# Patient Record
Sex: MALE | Race: BLACK OR AFRICAN AMERICAN
[De-identification: names, ages, dates, MRNs, and addresses within clinical notes are randomized per-mention and may not be internally consistent; named-entity substitution may affect disease eponyms.]

---

## 2017-11-14 ENCOUNTER — HOSPITAL ENCOUNTER (EMERGENCY)
Dept: HOSPITAL 74 - JER | Age: 47
Discharge: HOME | End: 2017-11-14
Payer: SELF-PAY

## 2017-11-14 VITALS — HEART RATE: 75 BPM | SYSTOLIC BLOOD PRESSURE: 153 MMHG | DIASTOLIC BLOOD PRESSURE: 81 MMHG

## 2017-11-14 VITALS — BODY MASS INDEX: 39.1 KG/M2

## 2017-11-14 VITALS — TEMPERATURE: 97.9 F

## 2017-11-14 DIAGNOSIS — F17.290: ICD-10-CM

## 2017-11-14 DIAGNOSIS — R10.31: Primary | ICD-10-CM

## 2017-11-14 LAB
ANION GAP SERPL CALC-SCNC: 7 MMOL/L (ref 8–16)
APPEARANCE UR: CLEAR
BASOPHILS # BLD: 0.8 % (ref 0–2)
BILIRUB UR STRIP.AUTO-MCNC: NEGATIVE MG/DL
CALCIUM SERPL-MCNC: 8.5 MG/DL (ref 8.5–10.1)
CO2 SERPL-SCNC: 27 MMOL/L (ref 21–32)
COLOR UR: (no result)
CREAT SERPL-MCNC: 0.8 MG/DL (ref 0.7–1.3)
DEPRECATED RDW RBC AUTO: 14 % (ref 11.9–15.9)
EOSINOPHIL # BLD: 1.1 % (ref 0–4.5)
GLUCOSE SERPL-MCNC: 100 MG/DL (ref 74–106)
KETONES UR QL STRIP: NEGATIVE
MCH RBC QN AUTO: 28.5 PG (ref 25.7–33.7)
MCHC RBC AUTO-ENTMCNC: 33.2 G/DL (ref 32–35.9)
MCV RBC: 85.7 FL (ref 80–96)
NEUTROPHILS # BLD: 62.4 % (ref 42.8–82.8)
NITRITE UR QL STRIP: NEGATIVE
PH UR: 6 [PH] (ref 5–8)
PLATELET # BLD AUTO: 165 K/MM3 (ref 134–434)
PMV BLD: 9.9 FL (ref 7.5–11.1)
PROT UR QL STRIP: NEGATIVE
PROT UR QL STRIP: NEGATIVE
RBC # UR STRIP: NEGATIVE /UL
SP GR UR: 1.01 (ref 1–1.03)
UROBILINOGEN UR STRIP-MCNC: NEGATIVE MG/DL (ref 0.2–1)
WBC # BLD AUTO: 6.6 K/MM3 (ref 4–10)

## 2017-11-14 PROCEDURE — 3E0333Z INTRODUCTION OF ANTI-INFLAMMATORY INTO PERIPHERAL VEIN, PERCUTANEOUS APPROACH: ICD-10-PCS | Performed by: EMERGENCY MEDICINE

## 2017-11-14 PROCEDURE — 3E033NZ INTRODUCTION OF ANALGESICS, HYPNOTICS, SEDATIVES INTO PERIPHERAL VEIN, PERCUTANEOUS APPROACH: ICD-10-PCS | Performed by: EMERGENCY MEDICINE

## 2017-11-14 NOTE — PDOC
History of Present Illness





- General


History Source: Patient


Exam Limitations: No Limitations





- History of Present Illness


Initial Comments: 





11/14/17 15:32


The patient is a 47 year old male, with no significant past medical history who 

presents to the emergency department with R flank pain.  Patient reports sudden 

onset of R flank pain that began yesterday while at rest. Patient reports 

associated foul smelling and dark colored urine. Patient denies taking any 

medications for relief and presents to the ED for further evaluation.  





He denies chest pain, headache or dizziness. He denies vomit, diarrhea or 

constipation. He denies dysuria, frequency, urgency or hematuria. Patient 

denies recent travel. 





Allergies: shellfish 


Past surgical history: None


Social history: Marijuana use


PCP:  None














<Ivis Siddiqui - Last Filed: 11/14/17 15:31>





<David Schaffer - Last Filed: 11/14/17 19:15>





- General


Chief Complaint: Urinary Problem


Stated Complaint: BLADDER INFECTION


Time Seen by Provider: 11/14/17 13:30





Past History





<Ivis Siddiqui - Last Filed: 11/14/17 15:31>





- Past Medical History


Asthma: Yes (BRONCHITIS)


CVA: No


COPD: No





- Immunization History


Td Vaccination: No (UNKNOWN)


Immunization Up to Date: Yes





- Suicide/Smoking/Psychosocial Hx


Smoking Status: Yes (CIGARS)


Smoking History: Never smoked


Have you smoked in the past 12 months: No


Number of Cigarettes Smoked Daily: 0


Cigars Per Day: 3


Information on smoking cessation initiated: No


Hx Alcohol Use: No


Drug/Substance Use Hx: Yes (Marijuna)


Substance Use Type: Marijuana





<David Schfafer - Last Filed: 11/14/17 19:15>





- Past Medical History


Allergies/Adverse Reactions: 


 Allergies











Allergy/AdvReac Type Severity Reaction Status Date / Time


 


shellfish derived AdvReac Severe Difficulty Verified 11/14/17 12:52





   Breathing  











Home Medications: 


Ambulatory Orders





No Home Medications 0 dose .ROUTE UTDICT 11/11/12 











**Review of Systems





- Review of Systems


Able to Perform ROS?: Yes


Comments:: 





11/14/17 15:32





A complete review of 10 out of 10 review of systems is taken and is negative 

apart from what is previously mentioned below and in the HPI.














<Ivis Siddiqui - Last Filed: 11/14/17 15:31>





*Physical Exam





- Vital Signs


 Last Vital Signs











Temp Pulse Resp BP Pulse Ox


 


 97.9 F   63   16   154/99   98 


 


 11/14/17 12:53  11/14/17 12:53  11/14/17 12:53  11/14/17 12:53  11/14/17 12:53














- Physical Exam


Comments: 





11/14/17 15:32


Vitals: Triage Vital signs reviewed


General Appearance:  no acute distress, well nourished well developed,


Head: Atraumatic, normocephalic


Neck:  Supple;No Nuchal rigidity


Chest Wall: Nontender


Cardiac: Regular rate and rhythm, no murmurs, no rubs, no gallops,


Lungs: Clear to auscultation bilateral, good air movement bilaterally,


Abdomen: R CVA Tenderness. Soft, nondistended, normal bowel sounds, nontender 

to palpation


Extremities: Full range of motion to all extremities, no cyanosis, clubbing, or 

edema


Skin:  Warm and dry, no rashes or lesions, no petechiae








<Ivis Siddiqui - Last Filed: 11/14/17 15:31>





- Vital Signs


 Last Vital Signs











Temp Pulse Resp BP Pulse Ox


 


 97.9 F   63   16   154/99   98 


 


 11/14/17 12:53  11/14/17 12:53  11/14/17 12:53  11/14/17 12:53  11/14/17 12:53














<David Schaffer - Last Filed: 11/14/17 19:15>





ED Treatment Course





- LABORATORY


CBC & Chemistry Diagram: 


 11/14/17 14:10





 11/14/17 14:32





- ADDITIONAL ORDERS


Additional order review: 


 Laboratory  Results











  11/14/17





  14:51


 


Urine Color  Ltyellow


 


Urine Appearance  Clear


 


Urine pH  6.0


 


Ur Specific Gravity  1.013


 


Urine Protein  Negative


 


Urine Glucose (UA)  Negative


 


Urine Ketones  Negative


 


Urine Blood  Negative


 


Urine Nitrite  Negative


 


Urine Bilirubin  Negative


 


Urine Urobilinogen  Negative














- Medications


Given in the ED: 


ED Medications














Discontinued Medications














Generic Name Dose Route Start Last Admin





  Trade Name Yomaira  PRN Reason Stop Dose Admin


 


Morphine Sulfate  4 mg  11/14/17 13:51  11/14/17 14:00





  Morphine Injection -  IVPUSH  11/14/17 13:52  4 mg





  ONCE ONE   Administration


 


Sodium Chloride  1,000 ml  11/14/17 13:51  11/14/17 14:15





  Normal Saline -  IV  11/14/17 13:52  1,000 ml





  ONCE ONE   Administration














<Ivis Siddiqui - Last Filed: 11/14/17 15:31>





- LABORATORY


CBC & Chemistry Diagram: 


 11/14/17 14:10





 11/14/17 14:32





<David Schaffer - Last Filed: 11/14/17 19:15>





Medical Decision Making





- Medical Decision Making





11/14/17 16:40


Reevaluation: 4:30 PM pain is much improved patient tolerating fluids by mouth. 

No fever no white count normal urinalysis repeat abdominal exam much improved 

from earlier very mild right sided tenderness but there is no rebound no 

guarding. The patient's CAT scan demonstrates no acute pathology there is no 

evidence of appendicitis on CAT scan





Given no fever no white count no findings of appendicitis on CT we discussed 

patient staying in the hospital for observation given that there still is a 

slight amount of pain or returning home and see how the pain progresses over 

the next few hours. Patient is here with his wife states he would prefer to go 

home he will return to the emergency department within the next several hours 

if the pain does not completely resolve or immediately if it worsens





Given no acute findings on laboratory analysis and CAT scan I feel that this is 

a reasonable plan





Findings, the need for follow-up and strict return instructions discussed with 

patient.





<David Schaffer - Last Filed: 11/14/17 19:15>





*DC/Admit/Observation/Transfer





- Attestations


Scribe Attestion: 





11/14/17 15:32





Documentation prepared by Ivis Siddiqui, acting as medical scribe for David Schaffer MD





<Ivis Siddiqui - Last Filed: 11/14/17 15:31>





- Discharge Dispostion


Admit: No





<David Schaffer - Last Filed: 11/14/17 19:15>


Diagnosis at time of Disposition: 


Abdominal pain


Qualifiers:


 Abdominal location: right lower quadrant Qualified Code(s): R10.31 - Right 

lower quadrant pain








- Discharge Dispostion


Disposition: HOME





- Referrals


Referrals: 


Radhames Valentino MD [Staff Physician] - 





- Patient Instructions


Printed Discharge Instructions:  DI for Abdominal Pain-Adult


Additional Instructions: 


The emergency department there was no definitive diagnosis made for your 

abdominal pain. Your blood work and CAT scan did not demonstrate any acute 

findings. Given that your pain has improved together we have determined it is 

safe to return home. Please return to the emergency department immediately for 

any pain is that does not completely improve or that returns he comes more 

intense. Tonight drink plenty of fluids and return to ED for any concerns.





- Post Discharge Activity


Forms/Work/School Notes:  Back to Work, Parent(s) Back to Work Note

## 2018-08-27 ENCOUNTER — HOSPITAL ENCOUNTER (EMERGENCY)
Dept: HOSPITAL 74 - JER | Age: 48
Discharge: HOME | End: 2018-08-27
Payer: COMMERCIAL

## 2018-08-27 VITALS — TEMPERATURE: 98.3 F | SYSTOLIC BLOOD PRESSURE: 136 MMHG | DIASTOLIC BLOOD PRESSURE: 95 MMHG | HEART RATE: 78 BPM

## 2018-08-27 VITALS — BODY MASS INDEX: 50.4 KG/M2

## 2018-08-27 DIAGNOSIS — R35.8: Primary | ICD-10-CM

## 2018-08-27 DIAGNOSIS — Z91.013: ICD-10-CM

## 2018-08-27 DIAGNOSIS — F17.290: ICD-10-CM

## 2018-08-27 DIAGNOSIS — E66.01: ICD-10-CM

## 2018-08-27 LAB
ALBUMIN SERPL-MCNC: 3.9 G/DL (ref 3.4–5)
ALP SERPL-CCNC: 52 U/L (ref 45–117)
ALT SERPL-CCNC: 20 U/L (ref 12–78)
ANION GAP SERPL CALC-SCNC: 4 MMOL/L (ref 8–16)
APPEARANCE UR: CLEAR
AST SERPL-CCNC: 19 U/L (ref 15–37)
BASOPHILS # BLD: 1.2 % (ref 0–2)
BILIRUB SERPL-MCNC: 0.4 MG/DL (ref 0.2–1)
BILIRUB UR STRIP.AUTO-MCNC: NEGATIVE MG/DL
BUN SERPL-MCNC: 8 MG/DL (ref 7–18)
CALCIUM SERPL-MCNC: 8.9 MG/DL (ref 8.5–10.1)
CHLORIDE SERPL-SCNC: 105 MMOL/L (ref 98–107)
CO2 SERPL-SCNC: 32 MMOL/L (ref 21–32)
COLOR UR: (no result)
CREAT SERPL-MCNC: 1 MG/DL (ref 0.7–1.3)
DEPRECATED RDW RBC AUTO: 14 % (ref 11.9–15.9)
EOSINOPHIL # BLD: 2.8 % (ref 0–4.5)
EPITH CASTS URNS QL MICRO: (no result) /HPF
GLUCOSE SERPL-MCNC: 94 MG/DL (ref 74–106)
HCT VFR BLD CALC: 44 % (ref 35.4–49)
HGB BLD-MCNC: 14.8 GM/DL (ref 11.7–16.9)
KETONES UR QL STRIP: NEGATIVE
LEUKOCYTE ESTERASE UR QL STRIP.AUTO: (no result)
LYMPHOCYTES # BLD: 28.3 % (ref 8–40)
MCH RBC QN AUTO: 28.9 PG (ref 25.7–33.7)
MCHC RBC AUTO-ENTMCNC: 33.7 G/DL (ref 32–35.9)
MCV RBC: 85.9 FL (ref 80–96)
MONOCYTES # BLD AUTO: 13 % (ref 3.8–10.2)
NEUTROPHILS # BLD: 54.7 % (ref 42.8–82.8)
NITRITE UR QL STRIP: NEGATIVE
PH UR: 6 [PH] (ref 5–8)
PLATELET # BLD AUTO: 173 K/MM3 (ref 134–434)
PMV BLD: 10.1 FL (ref 7.5–11.1)
POTASSIUM SERPLBLD-SCNC: 4.4 MMOL/L (ref 3.5–5.1)
PROT SERPL-MCNC: 7.1 G/DL (ref 6.4–8.2)
PROT UR QL STRIP: NEGATIVE
PROT UR QL STRIP: NEGATIVE
RBC # BLD AUTO: 5.13 M/MM3 (ref 4–5.6)
SODIUM SERPL-SCNC: 141 MMOL/L (ref 136–145)
SP GR UR: 1.01 (ref 1–1.03)
UROBILINOGEN UR STRIP-MCNC: NEGATIVE MG/DL (ref 0.2–1)
WBC # BLD AUTO: 5.9 K/MM3 (ref 4–10)

## 2018-08-27 NOTE — PDOC
History of Present Illness





- General


History Source: Patient





- History of Present Illness


Associated Symptoms: denies: fever/chills, nausea/vomiting, weakness





<Huang Villa - Last Filed: 08/27/18 14:57>





<Ginny Carpenter - Last Filed: 08/28/18 16:14>





- General


Chief Complaint: Pain


Stated Complaint: URINARY PROBLEM


Time Seen by Provider: 08/27/18 12:39





Past History





- Past Medical History


Asthma: Yes (BRONCHITIS)


CVA: No


COPD: No


DVT: No





- Immunization History


Td Vaccination: No (UNKNOWN)


Immunization Up to Date: Yes





- Suicide/Smoking/Psychosocial Hx


Smoking Status: Yes (CIGARS)


Smoking History: Never smoked


Have you smoked in the past 12 months: No


Number of Cigarettes Smoked Daily: 0


Cigars Per Day: 3


Information on smoking cessation initiated: No


Hx Alcohol Use: No


Drug/Substance Use Hx: Yes (Marijuna)


Substance Use Type: Marijuana





<Huang Villa - Last Filed: 08/27/18 14:57>





<Ginny Carpenter - Last Filed: 08/28/18 16:14>





- Past Medical History


Allergies/Adverse Reactions: 


 Allergies











Allergy/AdvReac Type Severity Reaction Status Date / Time


 


shellfish derived AdvReac Severe Difficulty Verified 08/27/18 12:10





   Breathing  











Home Medications: 


Ambulatory Orders





No Home Medications 0 dose .ROUTE UTDICT 11/11/12 











**Review of Systems





- Review of Systems


Constitutional: No: Chills, Fever, Malaise, Weakness, Unexplained wgt Loss


HEENTM: No: Blurred Vision


Respiratory: No: Shortness of Breath


Cardiac (ROS): No: Chest Pain


ABD/GI: No: Nausea, Vomiting, Abdominal cramping


: Yes: Frequency, Urgency.  No: Burning, Dysuria, Discharge, Flank Pain, 

Hematuria


Endocrine: Yes: Increased Thirst, Increased Urine





<Huang Villa - Last Filed: 08/27/18 14:57>





*Physical Exam





- Vital Signs


 Last Vital Signs











Temp Pulse Resp BP Pulse Ox


 


 98.3 F   78   18   136/95   100 


 


 08/27/18 12:11  08/27/18 12:11  08/27/18 12:11  08/27/18 12:11  08/27/18 12:11














- Physical Exam


General Appearance: Yes: Appropriately Dressed.  No: Apparent Distress


HEENT: positive: Normal Voice


Neck: positive: Supple


Respiratory/Chest: negative: Respiratory Distress


Gastrointestinal/Abdominal: positive: Soft.  negative: Tender


Integumentary: positive: Dry, Warm


Neurologic: positive: Fully Oriented, Alert, Normal Mood/Affect





<Huang Villa - Last Filed: 08/27/18 14:57>





- Vital Signs


 Last Vital Signs











Temp Pulse Resp BP Pulse Ox


 


 98.3 F   78   18   136/95   100 


 


 08/27/18 12:11  08/27/18 12:11  08/27/18 12:11  08/27/18 12:11  08/27/18 12:11














<Ginny Carpenter - Last Filed: 08/28/18 16:14>





ED Treatment Course





- LABORATORY


CBC & Chemistry Diagram: 


 08/27/18 13:00





 08/27/18 13:00





<Huang Villa - Last Filed: 08/27/18 14:57>





- LABORATORY


CBC & Chemistry Diagram: 


 08/27/18 13:00





 08/27/18 13:00





- ADDITIONAL ORDERS


Additional order review: 














 08/27/18 14:00 Urine Culture - Final





 Urine - Urine Clean Catch 








 











  08/27/18





  13:00


 


RBC  5.13


 


MCV  85.9


 


MCHC  33.7


 


RDW  14.0


 


MPV  10.1


 


Neutrophils %  54.7


 


Lymphocytes %  28.3  D


 


Monocytes %  13.0 H


 


Eosinophils %  2.8  D


 


Basophils %  1.2














<Ginny Carpenter - Last Filed: 08/28/18 16:14>





Medical Decision Making





- Medical Decision Making





08/27/18 13:02


47-year-old male, morbidly obesed (gained 40 pounds over the past yr per records

), here with polyruia 1 month.  Patient states for the past month, he gets up 

frequently to urinate throughout the night. Patient does report that he drinks 

plenty of fluids throughout the day, sometimes as late as midnight, which is 

not new for patient.  Unclear if patient has increased thirst.  Decided to come 

in for evaluation for first time today because of ongoing symptoms.   Denies 

dysuria, hematuria, penile discharge, testes pain/swelling, flank pain, n/v/f/c

, hesitancy, dribbling, dizziness, weakness, dry mouth or visual changes.  

States he does not have a PMD and has not gotten a comprehensive exam in many 

years. Denies fmhx of DM





See exam





R/o new onset DM, less likely UTI


Concern for new onset DM in morbidly obesed male w/ polydipsia/uria, less 

likely uti


Stable and in NAD


-labs/ua











08/27/18 14:30





08/27/18 14:31


Labs unremarkable including glucose and hemoglobin A1c.  UA with trace leuk 

esterase and 4 wbc's.  Will hold off on abx at this time pending ucx. Stable 

for dc w/ instructions to decrease fluid intake in the day, especially near 

bedtime.  Patient given referral for primary care











<Huang Villa - Last Filed: 08/27/18 14:57>





- Medical Decision Making





The patient was seen and evaluated in conjunction with midlevel provider under 

my direct supervision, ancillary studies were reviewed.  I agree with the plan 

as outlined by LOGAN Villa








08/28/18 16:14








<Ginny Carpenter - Last Filed: 08/28/18 16:14>





*DC/Admit/Observation/Transfer





<Huang Villa - Last Filed: 08/27/18 14:57>





<Ginny Carpenter - Last Filed: 08/28/18 16:14>


Diagnosis at time of Disposition: 


 Urinary frequency








- Discharge Dispostion


Disposition: HOME


Condition at time of disposition: Good





- Referrals


Referrals: 


[facility] [place] [Provider Group]





- Patient Instructions


Additional Instructions: 


The cause of  your symptoms are unclear at this time. 


Based on your lab work there is no evidence of diabetes or a urine infection.  

The cause of your symptoms could possibly be due to significant fluid intake 

during the day and near bedtime.  Please limit fluid intake  throughout the day 

and see if symptoms improve.


You need to follow-up with a primary care physician for comprehensive exam and 

was given referral to do so

## 2019-01-04 ENCOUNTER — HOSPITAL ENCOUNTER (EMERGENCY)
Dept: HOSPITAL 74 - JER | Age: 49
Discharge: HOME | End: 2019-01-04
Payer: COMMERCIAL

## 2019-01-04 VITALS — SYSTOLIC BLOOD PRESSURE: 142 MMHG | HEART RATE: 86 BPM | DIASTOLIC BLOOD PRESSURE: 88 MMHG

## 2019-01-04 VITALS — TEMPERATURE: 98.4 F

## 2019-01-04 VITALS — BODY MASS INDEX: 79.2 KG/M2

## 2019-01-04 DIAGNOSIS — J06.9: ICD-10-CM

## 2019-01-04 DIAGNOSIS — E66.9: ICD-10-CM

## 2019-01-04 DIAGNOSIS — J45.901: Primary | ICD-10-CM

## 2019-01-04 PROCEDURE — 3E0F7GC INTRODUCTION OF OTHER THERAPEUTIC SUBSTANCE INTO RESPIRATORY TRACT, VIA NATURAL OR ARTIFICIAL OPENING: ICD-10-PCS

## 2019-01-04 NOTE — PDOC
*Physical Exam





- Vital Signs


 Last Vital Signs











Temp Pulse Resp BP Pulse Ox


 


 98.4 F   79   18   136/97   94 L


 


 01/04/19 06:24  01/04/19 06:24  01/04/19 06:56  01/04/19 06:24  01/04/19 06:56




















- Physical Exam


General Appearance: Yes: Nourished, Appropriately Dressed, Apparent Distress


HEENT: positive: EOMI, ADRIANO, Normal ENT Inspection, Normal Voice


Neck: positive: Supple


Respiratory/Chest: positive: Wheezing (Diffuse)


Cardiovascular: positive: Regular Rhythm, Regular Rate, S1, S2


Vascular Pulses: Dorsalis-Pedis (R): 2+, Doralis-Pedis (L): 2+


Gastrointestinal/Abdominal: positive: Normal Bowel Sounds, Soft.  negative: 

Tender, Flat, Distended, Guarding, Rebound


Rectal Exam: positive: deferred


Lymphatic: negative: Adenopathy


Musculoskeletal: positive: Normal Inspection.  negative: CVA Tenderness


Extremity: positive: Normal Capillary Refill, Normal Inspection, Normal Range 

of Motion


Integumentary: positive: Normal Color, Dry, Warm


Neurologic: positive: CNs II-XII NML intact, Fully Oriented, Alert, Normal Mood/

Affect, Normal Response, Motor Strength 5/5





ED Treatment Course





- Medications


Given in the ED: 


ED Medications














Discontinued Medications














Generic Name Dose Route Start Last Admin





  Trade Name Yomaira  PRN Reason Stop Dose Admin


 


Albuterol/Ipratropium  2 amp  01/04/19 06:31  01/04/19 06:56





  Duoneb -  NEB  01/04/19 06:32  2 amp





  ONCE ONE   Administration





     





     





     





     


 


Prednisone  60 mg  01/04/19 06:32  01/04/19 06:56





  Deltasone -  PO  01/04/19 06:33  60 mg





  ONCE ONE   Administration





     





     





     





     














Medical Decision Making





- Medical Decision Making





Received patient as sign out from night team. 





47 Yo M with hx of bronchitis presents with wheezing SOB after URI symptoms. 

URI symptoms resolved days ago but wheezing and SOB persists. 





DDx IBNLT: bronchitis, PNA, URI, asthma, COPD, Pneumothorax. 





Plan: Duonebs, CXR, EKG, Steroids, re-assess. 





- CXR clean


- Patient feels better after duonebs. 





Will DC with steroids after one more neb. 




















*DC/Admit/Observation/Transfer


Diagnosis at time of Disposition: 


 Shortness of breath








- Discharge Dispostion


Disposition: HOME


Condition at time of disposition: Fair


Decision to Admit order: No





- Prescriptions


Prescriptions: 


Albuterol Sulfate Inhaler - [Ventolin Hfa Inhaler -] 1 - 2 inh PO Q4H #1 inhaler





- Referrals


Referrals: 


Lorelei Emery [Primary Care Provider] - 





- Patient Instructions


Printed Discharge Instructions:  How to Avoid a Cold or Flu, DI for Acute 

Bronchitis


Additional Instructions: 


Make sure to schedule a follow up with your PCP in the next 3 days. 


Diane schwab  your albuterol from the pharmacy. 





Come back to the ER if your SOB worsens, you feel like you can't breathe, have 

chest pain, or any other new or worsening concerns. 





Thank you for coming to the [facility]. We hope you feel better soon! 


Print Language: ENGLISH





- Post Discharge Activity

## 2019-01-04 NOTE — PDOC
History of Present Illness





- General


Chief Complaint: Cold Symptoms


Stated Complaint: CONGESTION/TIGHTNESS


Time Seen by Provider: 01/04/19 06:25


History Source: Patient


Exam Limitations: No Limitations





- History of Present Illness


Initial Comments: 





01/04/19 06:33


Patient is 48M with history of bronchitis and obesity here today complaining of 

shortness of breath that onset at 10pm last night. Patient states that he 

frequently gets bronchitis after getting sick, and normally uses his inhaler in 

order to relieve his symptoms. However, he has run out of his inhaler. Patient 

states that he had fevers and cough earlier in the week, but not yesterday. 

Denies chest pain, leg swelling, history of blood clots. Denies cardiac history.





Past History





- Past Medical History


Allergies/Adverse Reactions: 


 Allergies











Allergy/AdvReac Type Severity Reaction Status Date / Time


 


shellfish derived AdvReac Severe Difficulty Verified 01/04/19 06:27





   Breathing  











Home Medications: 


Ambulatory Orders





No Home Medications 0 dose .ROUTE UTDICT 11/11/12 








Asthma: Yes (BRONCHITIS)


CVA: No


COPD: No


DVT: No





- Immunization History


Td Vaccination: No (UNKNOWN)


Immunization Up to Date: Yes





- Suicide/Smoking/Psychosocial Hx


Smoking Status: Yes (CIGARS)


Smoking History: Smoker current status UNK


Have you smoked in the past 12 months: No


Number of Cigarettes Smoked Daily: 0


Cigars Per Day: 3


Information on smoking cessation initiated: No


Hx Alcohol Use: No


Drug/Substance Use Hx: Yes (Marijuana)


Substance Use Type: Marijuana





**Review of Systems





- Review of Systems


Comments:: 





01/04/19 06:37


GENERAL/CONSTITUTIONAL: No fever or chills. No weakness.


HEAD, EYES, EARS, NOSE AND THROAT: No change in vision. No sore throat.


CARDIOVASCULAR: No chest pain +shortness of breath


RESPIRATORY: +cough, no hemoptysis.


GASTROINTESTINAL: No nausea, vomiting, diarrhea or constipation.


GENITOURINARY: No dysuria, frequency, or change in urination.


MUSCULOSKELETAL: No joint or muscle swelling or pain. No neck or back pain.


SKIN: No rash


NEUROLOGIC: No headache, vertigo, loss of consciousness, or change in strength/

sensation.


HEMATOLOGIC/LYMPHATIC: No anemia, easy bleeding, or history of blood clots.








*Physical Exam





- Vital Signs


 Last Vital Signs











Temp Pulse Resp BP Pulse Ox


 


 98.4 F   79   18   136/97   94 L


 


 01/04/19 06:24  01/04/19 06:24  01/04/19 06:24  01/04/19 06:24  01/04/19 06:24














- Physical Exam


Comments: 





01/04/19 06:37


GENERAL: Awake, alert, and fully oriented, in no acute distress


HEAD: No signs of trauma, normocephalic, atraumatic


EYES: PERRLA, EOMI, sclera anicteric, conjunctiva clear


ENT: Auricles normal inspection, hearing grossly normal, nares patent, 

oropharynx clear without


exudates. Moist mucosa


NECK: Normal ROM, supple, no lymphadenopathy, JVD, or masses


LUNGS: No distress, speaks full sentences, scattered wheezes bilaterally


HEART: Regular rate and rhythm, normal S1 and S2, no murmurs, rubs or gallops, 

peripheral pulses normal and equal bilaterally.


ABDOMEN: Soft, nontender, normoactive bowel sounds.  No guarding, no rebound.  

No masses


EXTREMITIES: Normal inspection, Normal range of motion, no edema.  No clubbing 

or cyanosis.


NEUROLOGICAL: Cranial nerves II through XII grossly intact.  Normal speech, no 

focal sensorimotor deficits


SKIN: Warm, Dry, normal turgor, no rashes or lesions noted.








Moderate Sedation





- Procedure Monitoring


Vital Signs: 


Procedure Monitoring Vital Signs











Temperature  98.4 F   01/04/19 06:24


 


Pulse Rate  79   01/04/19 06:24


 


Respiratory Rate  18   01/04/19 06:24


 


Blood Pressure  136/97   01/04/19 06:24


 


O2 Sat by Pulse Oximetry (%)  94 L  01/04/19 06:24











ED Treatment Course





- RADIOLOGY


Radiology Studies Ordered: 














 Category Date Time Status


 


 CHEST PA & LAT [RAD] Stat Radiology  01/04/19 06:31 Ordered














Medical Decision Making





- Medical Decision Making





01/04/19 06:38


Patient is a 48M with history of bronchitis here today with wheezing and 

shortness of breath. DDx is focused on pneumonia vs COPD/Asthma exacerbation 2/

2 viral uri. No signs of chf, no chest pain for acs. Will evaluate with ekg, 

cxr. Will treat with duonebs and steroids. Inhaler sent to patient's pharmacy. 

EKG/CXR signed out to day team.





*DC/Admit/Observation/Transfer


Diagnosis at time of Disposition: 


 Shortness of breath








- Discharge Dispostion


Condition at time of disposition: Fair





- Referrals


Referrals: 


Lorelei Emery [Primary Care Provider] - 





- Patient Instructions





- Post Discharge Activity

## 2019-01-04 NOTE — PDOC
Attending Attestation





- Resident


Resident Name: Leobardo Fallon





- ED Attending Attestation


I have performed the following: I have examined & evaluated the patient, The 

case was reviewed & discussed with the resident, I agree w/resident's findings 

& plan, Exceptions are as noted





- HPI


HPI: 





01/04/19 07:19


48M denies pmh of asthma but states he tends to have symptomatic wheezing with 

URI.  Pt states that he has had several days of improving f/c, dry cough.  SOB 

started last night.





- Physicial Exam


PE: 





01/04/19 07:20


Agree with exam as documented by resident


Diffuse faint wheezing, good air movement, normal wob





- Medical Decision Making





01/04/19 07:20


Asthma exacerbation 2/2 uri





symptomatic tx


re-eval

## 2019-01-26 ENCOUNTER — HOSPITAL ENCOUNTER (EMERGENCY)
Dept: HOSPITAL 74 - JERFT | Age: 49
Discharge: HOME | End: 2019-01-26
Payer: COMMERCIAL

## 2019-01-26 VITALS — SYSTOLIC BLOOD PRESSURE: 151 MMHG | HEART RATE: 78 BPM | TEMPERATURE: 98.2 F | DIASTOLIC BLOOD PRESSURE: 91 MMHG

## 2019-01-26 VITALS — BODY MASS INDEX: 35.9 KG/M2

## 2019-01-26 DIAGNOSIS — Z87.09: ICD-10-CM

## 2019-01-26 DIAGNOSIS — K08.89: Primary | ICD-10-CM

## 2019-01-26 DIAGNOSIS — F17.290: ICD-10-CM

## 2019-01-26 PROCEDURE — 3E0233Z INTRODUCTION OF ANTI-INFLAMMATORY INTO MUSCLE, PERCUTANEOUS APPROACH: ICD-10-PCS

## 2019-01-26 NOTE — PDOC
History of Present Illness





- General


Chief Complaint: Toothache


Stated Complaint: TOOTHACHE


Time Seen by Provider: 01/26/19 14:53


History Source: Patient





- History of Present Illness


Timing/Duration: other


Associated Symptoms: denies: fever/chills





Past History





- Past Medical History


Allergies/Adverse Reactions: 


 Allergies











Allergy/AdvReac Type Severity Reaction Status Date / Time


 


shellfish derived AdvReac Severe Difficulty Verified 01/26/19 14:42





   Breathing  











Home Medications: 


Ambulatory Orders





No Home Medications 0 dose .ROUTE UTDICT 11/11/12 


Tramadol HCl 50 mg PO Q6H #15 tablet MDD 200mg 01/26/19 








Asthma: Yes (BRONCHITIS)


CVA: No


COPD: No


DVT: No





- Immunization History


Td Vaccination: No (UNKNOWN)


Immunization Up to Date: Yes





- Suicide/Smoking/Psychosocial Hx


Smoking Status: Yes (CIGARS)


Smoking History: Never smoked


Have you smoked in the past 12 months: No


Number of Cigarettes Smoked Daily: 0


Cigars Per Day: 3


Information on smoking cessation initiated: No


Hx Alcohol Use: Yes


Drug/Substance Use Hx: No


Substance Use Type: Marijuana





**Review of Systems





- Review of Systems


Constitutional: No: Fever





*Physical Exam





- Vital Signs


 Last Vital Signs











Temp Pulse Resp BP Pulse Ox


 


 98.2 F   78   16   151/91   97 


 


 01/26/19 14:43  01/26/19 14:43  01/26/19 14:43  01/26/19 14:43  01/26/19 14:43














- Physical Exam


General Appearance: Yes: Appropriately Dressed.  No: Apparent Distress


HEENT: positive: Normal Voice, Other (ttp to L upper 2nd molar, no erythema, 

swelling or discharge)


Neck: positive: Supple


Respiratory/Chest: negative: Respiratory Distress


Integumentary: positive: Dry, Warm


Neurologic: positive: Fully Oriented, Alert, Normal Mood/Affect





Moderate Sedation





- Procedure Monitoring


Vital Signs: 


Procedure Monitoring Vital Signs











Temperature  98.2 F   01/26/19 14:43


 


Pulse Rate  78   01/26/19 14:43


 


Respiratory Rate  16   01/26/19 14:43


 


Blood Pressure  151/91   01/26/19 14:43


 


O2 Sat by Pulse Oximetry (%)  97   01/26/19 14:43











ED Treatment Course





- Medications


Given in the ED: 


ED Medications














Discontinued Medications














Generic Name Dose Route Start Last Admin





  Trade Name Freq  PRN Reason Stop Dose Admin


 


Ketorolac Tromethamine  60 mg  01/26/19 15:01  01/26/19 15:05





  Toradol Injection -  IM  01/26/19 15:02  60 mg





  ONCE ONE   Administration





     





     





     





     














Medical Decision Making





- Medical Decision Making





01/26/19 15:12


48-year-old obese male, no significant history here with pain to L upper 2nd 

molar 2 days.  No trauma.  Denies facial swelling, fever or chills.  States he 

had a left lower tooth extracted in dental office 6 months ago.





Se exam





Dental pain


No trauma


No f/c


No e/o infxn


-dc w/ pain control


-has dental f/u in 2 days





*DC/Admit/Observation/Transfer


Diagnosis at time of Disposition: 


 Pain, dental








- Discharge Dispostion


Disposition: HOME


Condition at time of disposition: Good





- Prescriptions


Prescriptions: 


Tramadol HCl 50 mg PO Q6H #15 tablet MDD 200mg





- Referrals





- Patient Instructions


Printed Discharge Instructions:  DI for Dental Pain


Additional Instructions: 


Please take pain medication as needed.  There was no sign of infection, so 

antibiotics was not necessary


Please follow-up with your dentist as scheduled on Monday





- Post Discharge Activity

## 2019-02-14 NOTE — EKG
Test Reason : 

Blood Pressure : ***/*** mmHG

Vent. Rate : 074 BPM     Atrial Rate : 074 BPM

   P-R Int : 176 ms          QRS Dur : 100 ms

    QT Int : 386 ms       P-R-T Axes : 066 -44 017 degrees

   QTc Int : 428 ms

 

NORMAL SINUS RHYTHM

LEFT AXIS DEVIATION

INFERIOR INFARCT , AGE UNDETERMINED

ABNORMAL ECG

WHEN COMPARED WITH ECG OF 07-MAY-2015 01:43,

INFERIOR INFARCT IS NOW PRESENT

Confirmed by MEENAKSHI CASON, REGGIE ([id number]) on 1/4/2019 9:07:18 AM

Also confirmed by REGGIE ARRIETA MD ([id number]),  LEEANNE NASH ([id number])  on 2/14/2019 8:34:34 AM

 

Referred By:             Confirmed By:REGGIE ARRIETA MD

## 2019-04-01 ENCOUNTER — HOSPITAL ENCOUNTER (EMERGENCY)
Dept: HOSPITAL 74 - JERFT | Age: 49
Discharge: HOME | End: 2019-04-01
Payer: SELF-PAY

## 2019-04-01 VITALS — TEMPERATURE: 98 F | DIASTOLIC BLOOD PRESSURE: 101 MMHG | HEART RATE: 80 BPM | SYSTOLIC BLOOD PRESSURE: 145 MMHG

## 2019-04-01 VITALS — BODY MASS INDEX: 39.8 KG/M2

## 2019-04-01 DIAGNOSIS — Z20.2: Primary | ICD-10-CM

## 2019-04-01 DIAGNOSIS — R03.0: ICD-10-CM

## 2019-04-01 LAB
APPEARANCE UR: CLEAR
BACTERIA #/AREA URNS HPF: 0.7 /HPF
BILIRUB UR STRIP.AUTO-MCNC: NEGATIVE MG/DL
CASTS #/AREA URNS LPF: 7 /HPF (ref 0–8)
COLOR UR: YELLOW
EPITH CASTS URNS QL MICRO: 1.2 /HPF (ref 0–5)
KETONES UR QL STRIP: (no result)
LEUKOCYTE ESTERASE UR QL STRIP.AUTO: (no result)
NITRITE UR QL STRIP: NEGATIVE
PH UR: 5 [PH] (ref 5–8)
PROT UR QL STRIP: NEGATIVE
PROT UR QL STRIP: NEGATIVE
RBC # BLD AUTO: 1 /HPF (ref 0–4)
SP GR UR: 1.01 (ref 1.01–1.03)
UROBILINOGEN UR STRIP-MCNC: 1 MG/DL (ref 0.2–1)
WBC # UR AUTO: 7 /HPF (ref 0–5)

## 2019-04-01 NOTE — PDOC
*Physical Exam





- Vital Signs


 Last Vital Signs











Temp Pulse Resp BP Pulse Ox


 


 98.0 F   80   18   145/101 H  98 


 


 04/01/19 14:07  04/01/19 14:07  04/01/19 14:07  04/01/19 14:07  04/01/19 14:07














- Physical Exam


General Appearance: Yes: Nourished, Appropriately Dressed.  No: Apparent 

Distress


HEENT: positive: Normal ENT Inspection


Neck: positive: Supple


Respiratory/Chest: negative: Respiratory Distress, Accessory Muscle Use


Cardiovascular: positive: Regular Rhythm, Regular Rate


Male Genitalia: positive: normal genitalia.  negative: discharge, testicular 

tenderness, testicular mass, epididymus tender, inguinal hernia, hernia, CVAT


Musculoskeletal: positive: Normal Inspection.  negative: CVA Tenderness


Neurologic: positive: Fully Oriented, Alert, Normal Response





Medical Decision Making





- Medical Decision Making





04/01/19 15:11


Patient present for evaluation of the lower suprapubic pain and feeling of 

scrotal pain after having unprotected sex with ago 2 days ago. Patient reported 

having similar episode in the past and in the Chlamydia infection. Patient was 

seen by  provider up front but did not tell the story due to provided being a 

female. Patient reported condom broke while having sex 2 days ago. Denies any 

other symptoms.


Clinical exam unremarkable with no scrotal pain, swelling or abdominal 

tenderness on exam. no testicular pain on exam. Normal to testicles or scrotum 

on exam.


UA, urine culture labs ordered. Ceftriaxone 250 mg IM and azithromycin 1 g by 

mouth given for gonorrhea and Chlamydia prophylaxis. She is stable for 

discharge with urology follow-up











*DC/Admit/Observation/Transfer


Diagnosis at time of Disposition: 


 STD (sexually transmitted disease), Elevated blood pressure reading





Abdominal pain


Qualifiers:


 Abdominal location: epigastric Qualified Code(s): R10.13 - Epigastric pain








- Discharge Dispostion


Disposition: HOME


Condition at time of disposition: Good


Decision to Admit order: No





- Referrals


Referrals: 


shanta Soliz [Other]


Kaushik Johnston MD [Staff Physician] - 





- Patient Instructions


Additional Instructions: 


Follow-up with PCP for elevated blood pressure for repeat measurement and follow

-up treatment. You will be contacted with urine culture results





- Post Discharge Activity


Forms/Work/School Notes:  Back to Work

## 2019-04-01 NOTE — PDOC
History of Present Illness





- General


Chief Complaint: Note to return to work


Stated Complaint: PHYSICAL CHECK UP


Time Seen by Provider: 04/01/19 14:07


History Source: Patient


Exam Limitations: No Limitations





- History of Present Illness


Initial Comments: 





04/01/19 14:07


Patient reports no significant medical or surgical history presents with 

request for physical examination. States no pain or symptoms today.  States 

needs a letter that states that he is physically fit for work.  


Timing/Duration: unsure


Severity: mild


Modifying Factors: improves with: other


Associated Symptoms: reports: denies symptoms


Aspirin Received prior to arrival: Yes: no aspirin today


Asa Contraindications(Core Measure): No: Allergy


Beta Blocker Contraindications(Core Measure): Yes: Not Prescribed


Beta Blocker Given by EMS(Core Measure): No


Beta Blocker Taken at Home(Core Measure): No


Beta Blocker Not Indicated at this Time(Core Measure): No





Past History





- Travel


Traveled outside of the country in the last 30 days: No





- Past Medical History


Allergies/Adverse Reactions: 


 Allergies











Allergy/AdvReac Type Severity Reaction Status Date / Time


 


shellfish derived AdvReac Severe Difficulty Verified 04/01/19 13:58





   Breathing  











Home Medications: 


Ambulatory Orders





No Home Medications 0 dose .ROUTE UTDICT 11/11/12 


Tramadol HCl 50 mg PO Q6H #15 tablet MDD 200mg 01/26/19 








Asthma: Yes (BRONCHITIS)


CVA: No


COPD: No


DVT: No





- Immunization History


Td Vaccination: No (UNKNOWN)


Immunization Up to Date: Yes





- Suicide/Smoking/Psychosocial Hx


Smoking Status: Yes (CIGARS)


Smoking History: Smoker current status UNK


Have you smoked in the past 12 months: No


Number of Cigarettes Smoked Daily: 0


Cigars Per Day: 3


Hx Alcohol Use: No


Drug/Substance Use Hx: Yes (Marijuana)


Substance Use Type: Marijuana





**Review of Systems





- Review of Systems


Able to Perform ROS?: Yes


Is the patient limited English proficient: No


Constitutional: No: Chills, Fever, Loss of Appetite


HEENTM: No: Ear Pain, Nose Congestion, Throat Pain


Respiratory: No: Cough, Orthopnea


Cardiac (ROS): No: Chest Pain, Lightheadedness


: No: Flank Pain


Integumentary: No: Bruising, Erythema


Neurological: No: Headache, Numbness


Endocrine: No: Intolerance to Cold, Increased Urine


Hematologic/Lymphatic: No: Blood Clots





*Physical Exam





- Physical Exam


General Appearance: Yes: Nourished.  No: Apparent Distress


HEENT: negative: EOMI, ADRIANO, Rhinorrhea, Sinus Tenderness


Neck: positive: Supple.  negative: Lymphadenopathy (R), Lymphadenopathy (L)


Respiratory/Chest: positive: Chest Tender, Lungs Clear


Cardiovascular: positive: Regular Rhythm, Regular Rate


Neurologic: positive: CNs II-XII NML intact, Fully Oriented





Medical Decision Making





- Medical Decision Making





04/01/19 14:13


48 year old male with no significant medical or surgical history present 

requesting physical examination for work.  Has no complaint of symptoms, pain, 

shortness of breath , dizziness, nausea or vomiting.  





Blood pressure slightly elevated





Plan: 


discussed a low salt diet


increase in activity


ekg 








if ekg normal 


d/c home 


referred to pmd for physical 





*DC/Admit/Observation/Transfer


Diagnosis at time of Disposition: 


 Elevated blood pressure reading








- Discharge Dispostion


Disposition: HOME


Condition at time of disposition: Good


Decision to Admit order: No





- Referrals


Referrals: 


Kaushik Johnston MD [Staff Physician] - 


shanta Soliz [Other]





- Patient Instructions





- Post Discharge Activity


Forms/Work/School Notes:  Back to Work

## 2019-04-02 NOTE — EKG
Test Reason : 

Blood Pressure : ***/*** mmHG

Vent. Rate : 082 BPM     Atrial Rate : 082 BPM

   P-R Int : 158 ms          QRS Dur : 098 ms

    QT Int : 374 ms       P-R-T Axes : 059 -26 018 degrees

   QTc Int : 436 ms

 

NORMAL SINUS RHYTHM WITH SINUS ARRHYTHMIA

INFERIOR INFARCT (CITED ON OR BEFORE 04-JAN-2019)

CANNOT RULE OUT ANTERIOR INFARCT , AGE UNDETERMINED

ABNORMAL ECG

WHEN COMPARED WITH ECG OF 04-JAN-2019 07:02,

MINIMAL CRITERIA FOR ANTERIOR INFARCT ARE NOW PRESENT

Confirmed by Milton Kelly ([id number]) on 4/2/2019 2:43:27 PM

 

Referred By:             Confirmed By:Milton Kelly

## 2019-08-11 ENCOUNTER — HOSPITAL ENCOUNTER (EMERGENCY)
Dept: HOSPITAL 74 - JERFT | Age: 49
Discharge: HOME | End: 2019-08-11
Payer: COMMERCIAL

## 2019-08-11 VITALS — BODY MASS INDEX: 41.7 KG/M2

## 2019-08-11 VITALS — SYSTOLIC BLOOD PRESSURE: 144 MMHG | HEART RATE: 99 BPM | TEMPERATURE: 98.1 F | DIASTOLIC BLOOD PRESSURE: 104 MMHG

## 2019-08-11 DIAGNOSIS — R36.9: ICD-10-CM

## 2019-08-11 DIAGNOSIS — Z76.0: ICD-10-CM

## 2019-08-11 DIAGNOSIS — A64: Primary | ICD-10-CM

## 2019-08-11 LAB
APPEARANCE UR: CLEAR
BACTERIA # UR AUTO: 8.1 /HPF
BILIRUB UR STRIP.AUTO-MCNC: NEGATIVE MG/DL
CASTS URNS QL MICRO: 4 /LPF (ref 0–8)
COLOR UR: YELLOW
EPITH CASTS URNS QL MICRO: 3.4 /HPF
KETONES UR QL STRIP: NEGATIVE
LEUKOCYTE ESTERASE UR QL STRIP.AUTO: (no result)
NITRITE UR QL STRIP: NEGATIVE
PH UR: 5.5 [PH] (ref 5–8)
PROT UR QL STRIP: NEGATIVE
PROT UR QL STRIP: NEGATIVE
RBC # BLD AUTO: 1 /HPF (ref 0–4)
SP GR UR: 1.02 (ref 1.01–1.03)
UROBILINOGEN UR STRIP-MCNC: 1 MG/DL (ref 0.2–1)
WBC # UR AUTO: 9 /HPF (ref 0–5)

## 2019-08-11 NOTE — PDOC
History of Present Illness





- General


Chief Complaint: RX Refill


Stated Complaint: PRESCRIPTION


Time Seen by Provider: 08/11/19 20:13


History Source: Patient


Exam Limitations: Clinical Condition





- History of Present Illness


Initial Comments: 





08/11/19 20:24


Patient with no significant past medical history present with complaint of pain 

no clear discharge since this morning after having unprotected sex 3 days ago. 

Patient told different complained to triage which is not his actual reason for 

coming. Denies pain no pain, rash, burning with urination, scrotal swelling. 

Denies any other symptoms


Timing/Duration: 24 hours





Past History





- Past Medical History


Allergies/Adverse Reactions: 


 Allergies











Allergy/AdvReac Type Severity Reaction Status Date / Time


 


shellfish derived AdvReac Severe Difficulty Verified 04/01/19 13:58





   Breathing  











Home Medications: 


Ambulatory Orders





Albuterol Sulfate Inhaler - [Ventolin Hfa Inhaler -] 2 inh PO Q6H #1 inh 08/11/ 19 








Asthma: Yes (BRONCHITIS)


CVA: No


COPD: No


DVT: No





- Immunization History


Td Vaccination: No (UNKNOWN)


Immunization Up to Date: Yes





- Suicide/Smoking/Psychosocial Hx


Smoking Status: Yes (CIGARS)


Smoking History: Never smoked


Have you smoked in the past 12 months: No


Number of Cigarettes Smoked Daily: 0


Cigars Per Day: 3


Hx Alcohol Use: No


Drug/Substance Use Hx: Yes (Marijuana)


Substance Use Type: Marijuana





**Review of Systems





- Review of Systems


Able to Perform ROS?: Yes


Is the patient limited English proficient: No


Constitutional: No: Chills, Fever


HEENTM: No: Symptoms Reported


Respiratory: No: Symptoms reported


Cardiac (ROS): No: Symptoms Reported


ABD/GI: No: Symptoms Reported


: Yes: Symptoms Reported, See HPI, Discharge (clear penile discharge).  No: 

Testicular Mass, Testicular Swelling, Lesions, Testicular Pain


All Other Systems: Reviewed and Negative





*Physical Exam





- Vital Signs


 Last Vital Signs











Temp Pulse Resp BP Pulse Ox


 


 98.1 F   99 H  20   144/104 H  99 


 


 08/11/19 19:39  08/11/19 19:39  08/11/19 19:39  08/11/19 19:39  08/11/19 19:39














- Physical Exam


General Appearance: Yes: Nourished, Appropriately Dressed.  No: Apparent 

Distress


HEENT: positive: Normal ENT Inspection


Neck: positive: Supple


Respiratory/Chest: positive: Lungs Clear, Normal Breath Sounds.  negative: 

Respiratory Distress, Accessory Muscle Use


Cardiovascular: positive: Regular Rhythm, Regular Rate


Gastrointestinal/Abdominal: positive: Normal Bowel Sounds, Flat, Soft.  negative

: Tender, Organomegaly


Male Genitalia: positive: normal genitalia.  negative: discharge, testicular 

tenderness, testicular mass, epididymus tender, hematuria


Musculoskeletal: positive: Normal Inspection


Extremity: positive: Normal Capillary Refill


Integumentary: positive: Normal Color.  negative: Rash


Neurologic: positive: Fully Oriented, Alert, Normal Mood/Affect, Normal Response





Medical Decision Making





- Medical Decision Making





08/11/19 20:25


Patient with no significant past medical history present with complaint of pain 

no clear discharge since this morning after having unprotected sex 3 days ago. 

Patient told different complained to triage which is not his actual reason for 

coming. Denies pain no pain, rash, burning with urination, scrotal swelling. 

Denies any other symptoms


Clinical exam significant for no penile discharge and no rash to general area. 

No scrotal swelling or tenderness.


UA, urine culture and urine GC and chlamydia tests ordered. Patient be treated 

prophylactically for STI with ceftriaxone 250 mg IM and azithromycin 1 g by 

mouth pending lab results





*DC/Admit/Observation/Transfer


Diagnosis at time of Disposition: 


 STD (sexually transmitted disease), Penile discharge, Medication refill








- Discharge Dispostion


Disposition: HOME


Condition at time of disposition: Stable


Decision to Admit order: No





- Prescriptions


Prescriptions: 


Albuterol Sulfate Inhaler - [Ventolin Hfa Inhaler -] 2 inh PO Q6H #1 inh





- Referrals


Referrals: 


Alejo Mansfield MD., MD [Staff Physician] - 





- Patient Instructions


Additional Instructions: 


Your be contacted with lab results. No unprotected sex for at least 2 weeks. 

Follow-up with referred neurology if symptoms persist for more than 4 days





- Post Discharge Activity

## 2021-03-27 ENCOUNTER — HOSPITAL ENCOUNTER (EMERGENCY)
Dept: HOSPITAL 74 - JERFT | Age: 51
Discharge: HOME | End: 2021-03-27
Payer: COMMERCIAL

## 2021-03-27 VITALS — DIASTOLIC BLOOD PRESSURE: 94 MMHG | SYSTOLIC BLOOD PRESSURE: 136 MMHG | TEMPERATURE: 97.8 F | HEART RATE: 72 BPM

## 2021-03-27 VITALS — BODY MASS INDEX: 43.6 KG/M2

## 2021-03-27 DIAGNOSIS — R36.9: Primary | ICD-10-CM

## 2021-03-27 LAB
APPEARANCE UR: CLEAR
BACTERIA # UR AUTO: 306 /UL (ref 0–1359)
BILIRUB UR STRIP.AUTO-MCNC: NEGATIVE MG/DL
CASTS URNS QL MICRO: 2 /UL (ref 0–3.1)
COLOR UR: YELLOW
EPITH CASTS URNS QL MICRO: 10 /UL (ref 0–25.1)
KETONES UR QL STRIP: NEGATIVE
LEUKOCYTE ESTERASE UR QL STRIP.AUTO: (no result)
NITRITE UR QL STRIP: NEGATIVE
PH UR: 5 [PH] (ref 5–8)
PROT UR QL STRIP: NEGATIVE
PROT UR QL STRIP: NEGATIVE
RBC # BLD AUTO: 5 /UL (ref 0–23.9)
SP GR UR: 1.01 (ref 1.01–1.03)
UROBILINOGEN UR STRIP-MCNC: 0.2 MG/DL (ref 0.2–1)
WBC # UR AUTO: 25 /UL (ref 0–25.8)

## 2021-03-27 PROCEDURE — 3E023GC INTRODUCTION OF OTHER THERAPEUTIC SUBSTANCE INTO MUSCLE, PERCUTANEOUS APPROACH: ICD-10-PCS | Performed by: EMERGENCY MEDICINE

## 2021-07-24 ENCOUNTER — HOSPITAL ENCOUNTER (EMERGENCY)
Dept: HOSPITAL 74 - JERFT | Age: 51
Discharge: HOME | End: 2021-07-24
Payer: COMMERCIAL

## 2021-07-24 VITALS — BODY MASS INDEX: 47.1 KG/M2

## 2021-07-24 VITALS — TEMPERATURE: 98.4 F | DIASTOLIC BLOOD PRESSURE: 85 MMHG | HEART RATE: 94 BPM | SYSTOLIC BLOOD PRESSURE: 134 MMHG

## 2021-07-24 DIAGNOSIS — L02.213: Primary | ICD-10-CM

## 2021-07-24 DIAGNOSIS — Z20.2: ICD-10-CM

## 2023-02-06 ENCOUNTER — HOSPITAL ENCOUNTER (EMERGENCY)
Dept: HOSPITAL 74 - JERFT | Age: 53
Discharge: HOME | End: 2023-02-06
Payer: COMMERCIAL

## 2023-02-06 VITALS
RESPIRATION RATE: 18 BRPM | SYSTOLIC BLOOD PRESSURE: 144 MMHG | TEMPERATURE: 98.3 F | HEART RATE: 77 BPM | DIASTOLIC BLOOD PRESSURE: 90 MMHG

## 2023-02-06 VITALS — BODY MASS INDEX: 41.1 KG/M2

## 2023-02-06 DIAGNOSIS — N30.00: Primary | ICD-10-CM

## 2023-02-06 LAB
ALBUMIN SERPL-MCNC: 3.8 G/DL (ref 3.4–5)
ALP SERPL-CCNC: 53 U/L (ref 45–117)
ALT SERPL-CCNC: 20 U/L (ref 13–61)
ANION GAP SERPL CALC-SCNC: 7 MMOL/L (ref 8–16)
APPEARANCE UR: CLEAR
AST SERPL-CCNC: 39 U/L (ref 15–37)
BACTERIA # UR AUTO: 42 /UL (ref 0–1359)
BASOPHILS # BLD: 1.1 % (ref 0–2)
BILIRUB SERPL-MCNC: 0.4 MG/DL (ref 0.2–1)
BILIRUB UR STRIP.AUTO-MCNC: NEGATIVE MG/DL
BUN SERPL-MCNC: 10.1 MG/DL (ref 7–18)
CALCIUM SERPL-MCNC: 8.7 MG/DL (ref 8.5–10.1)
CASTS URNS QL MICRO: 1 /UL (ref 0–3.1)
CHLORIDE SERPL-SCNC: 107 MMOL/L (ref 98–107)
CO2 SERPL-SCNC: 27 MMOL/L (ref 21–32)
COLOR UR: YELLOW
CREAT SERPL-MCNC: 1 MG/DL (ref 0.55–1.3)
DEPRECATED RDW RBC AUTO: 14.3 % (ref 11.9–15.9)
EOSINOPHIL # BLD: 1.9 % (ref 0–4.5)
EPITH CASTS URNS QL MICRO: 13 /UL (ref 0–25.1)
GLUCOSE SERPL-MCNC: 106 MG/DL (ref 74–106)
HCT VFR BLD CALC: 42.1 % (ref 35.4–49)
HGB BLD-MCNC: 14.1 GM/DL (ref 11.7–16.9)
HIV 1+2 AB+HIV1 P24 AG SERPL QL IA: NEGATIVE
KETONES UR QL STRIP: NEGATIVE
LEUKOCYTE ESTERASE UR QL STRIP.AUTO: (no result)
LYMPHOCYTES # BLD: 26.8 % (ref 8–40)
MCH RBC QN AUTO: 29 PG (ref 25.7–33.7)
MCHC RBC AUTO-ENTMCNC: 33.4 G/DL (ref 32–35.9)
MCV RBC: 86.6 FL (ref 80–96)
MONOCYTES # BLD AUTO: 13.2 % (ref 3.8–10.2)
NEUTROPHILS # BLD: 57 % (ref 42.8–82.8)
NITRITE UR QL STRIP: NEGATIVE
PH UR: 5.5 [PH] (ref 5–8)
PLATELET # BLD AUTO: 196 10^3/UL (ref 134–434)
PMV BLD: 9.9 FL (ref 7.5–11.1)
PROT SERPL-MCNC: 7 G/DL (ref 6.4–8.2)
PROT UR QL STRIP: NEGATIVE
PROT UR QL STRIP: NEGATIVE
RBC # BLD AUTO: 10 /UL (ref 0–23.9)
RBC # BLD AUTO: 4.86 M/MM3 (ref 4–5.6)
SODIUM SERPL-SCNC: 140 MMOL/L (ref 136–145)
SP GR UR: 1.01 (ref 1.01–1.03)
UROBILINOGEN UR STRIP-MCNC: 0.2 MG/DL (ref 0.2–1)
WBC # BLD AUTO: 6.4 K/MM3 (ref 4–10)
WBC # UR AUTO: 61 /UL (ref 0–25.8)

## 2023-04-23 ENCOUNTER — HOSPITAL ENCOUNTER (EMERGENCY)
Dept: HOSPITAL 74 - JER | Age: 53
Discharge: HOME | End: 2023-04-23
Payer: COMMERCIAL

## 2023-04-23 VITALS — DIASTOLIC BLOOD PRESSURE: 89 MMHG | HEART RATE: 75 BPM | SYSTOLIC BLOOD PRESSURE: 127 MMHG

## 2023-04-23 VITALS — TEMPERATURE: 98.5 F | RESPIRATION RATE: 18 BRPM

## 2023-04-23 VITALS — BODY MASS INDEX: 41.1 KG/M2

## 2023-04-23 DIAGNOSIS — R10.2: Primary | ICD-10-CM

## 2023-04-23 DIAGNOSIS — Z70.1: ICD-10-CM

## 2023-04-23 LAB
APPEARANCE UR: CLEAR
BACTERIA # UR AUTO: 9 /UL (ref 0–1359)
BILIRUB UR STRIP.AUTO-MCNC: NEGATIVE MG/DL
CASTS URNS QL MICRO: 4 /UL (ref 0–3.1)
COLOR UR: YELLOW
EPITH CASTS URNS QL MICRO: >36 /UL (ref 0–25.1)
KETONES UR QL STRIP: NEGATIVE
LEUKOCYTE ESTERASE UR QL STRIP.AUTO: (no result)
NITRITE UR QL STRIP: NEGATIVE
PH UR: 5.5 [PH] (ref 5–8)
PROT UR QL STRIP: NEGATIVE
PROT UR QL STRIP: NEGATIVE
RBC # BLD AUTO: 22 /UL (ref 0–23.9)
SP GR UR: 1.01 (ref 1.01–1.03)
UROBILINOGEN UR STRIP-MCNC: 0.2 MG/DL (ref 0.2–1)
WBC # UR AUTO: 122 /UL (ref 0–25.8)